# Patient Record
Sex: FEMALE | Race: WHITE | Employment: FULL TIME | URBAN - METROPOLITAN AREA
[De-identification: names, ages, dates, MRNs, and addresses within clinical notes are randomized per-mention and may not be internally consistent; named-entity substitution may affect disease eponyms.]

---

## 2017-05-17 ENCOUNTER — HOSPITAL ENCOUNTER (EMERGENCY)
Age: 57
Discharge: HOME | End: 2017-05-17
Attending: EMERGENCY MEDICINE
Payer: COMMERCIAL

## 2017-05-17 ENCOUNTER — APPOINTMENT (OUTPATIENT)
Dept: GENERAL RADIOLOGY | Age: 57
End: 2017-05-17
Attending: PHYSICIAN ASSISTANT
Payer: COMMERCIAL

## 2017-05-17 VITALS
WEIGHT: 195 LBS | DIASTOLIC BLOOD PRESSURE: 56 MMHG | HEART RATE: 67 BPM | HEIGHT: 67 IN | SYSTOLIC BLOOD PRESSURE: 115 MMHG | BODY MASS INDEX: 30.61 KG/M2 | OXYGEN SATURATION: 97 % | TEMPERATURE: 98 F | RESPIRATION RATE: 20 BRPM

## 2017-05-17 DIAGNOSIS — J06.9 UPPER RESPIRATORY TRACT INFECTION, UNSPECIFIED TYPE: Primary | ICD-10-CM

## 2017-05-17 PROBLEM — I48.91 A-FIB (HCC): Status: ACTIVE | Noted: 2017-05-17

## 2017-05-17 PROCEDURE — 94640 AIRWAY INHALATION TREATMENT: CPT

## 2017-05-17 PROCEDURE — 71020 XR CHEST PA + LAT CHEST (CPT=71020): CPT | Performed by: PHYSICIAN ASSISTANT

## 2017-05-17 PROCEDURE — 99284 EMERGENCY DEPT VISIT MOD MDM: CPT

## 2017-05-17 RX ORDER — AMOXICILLIN AND CLAVULANATE POTASSIUM 875; 125 MG/1; MG/1
1 TABLET, FILM COATED ORAL 2 TIMES DAILY
Qty: 20 TABLET | Refills: 0 | Status: SHIPPED | OUTPATIENT
Start: 2017-05-17 | End: 2017-05-27

## 2017-05-17 RX ORDER — ALBUTEROL SULFATE 2.5 MG/3ML
2.5 SOLUTION RESPIRATORY (INHALATION) ONCE
Status: COMPLETED | OUTPATIENT
Start: 2017-05-17 | End: 2017-05-17

## 2017-05-17 RX ORDER — PREDNISONE 20 MG/1
40 TABLET ORAL DAILY
Qty: 10 TABLET | Refills: 0 | Status: SHIPPED | OUTPATIENT
Start: 2017-05-17 | End: 2017-05-22

## 2017-05-17 RX ORDER — ALBUTEROL SULFATE 90 UG/1
2 AEROSOL, METERED RESPIRATORY (INHALATION) EVERY 4 HOURS PRN
Qty: 1 INHALER | Refills: 0 | Status: SHIPPED | OUTPATIENT
Start: 2017-05-17 | End: 2017-06-16

## 2017-05-17 RX ORDER — BENZONATATE 100 MG/1
100 CAPSULE ORAL 3 TIMES DAILY PRN
COMMUNITY
End: 2022-02-01 | Stop reason: ALTCHOICE

## 2017-05-17 RX ORDER — ALBUTEROL SULFATE 2.5 MG/3ML
2.5 SOLUTION RESPIRATORY (INHALATION) EVERY 4 HOURS PRN
Qty: 30 AMPULE | Refills: 0 | Status: SHIPPED | OUTPATIENT
Start: 2017-05-17 | End: 2017-06-16

## 2017-05-17 RX ORDER — ALBUTEROL SULFATE 90 UG/1
AEROSOL, METERED RESPIRATORY (INHALATION) EVERY 6 HOURS PRN
COMMUNITY

## 2017-05-17 RX ORDER — PREDNISONE 20 MG/1
20 TABLET ORAL DAILY
COMMUNITY
End: 2022-02-01 | Stop reason: ALTCHOICE

## 2017-05-17 NOTE — ED PROVIDER NOTES
Patient Seen in: THE [facility] Emergency Department In [place]    History   Patient presents with:  Dyspnea MILAN SOB (respiratory)    Stated Complaint: milan    HPI    Patient is a pleasant 58-year-old female.   90s prior to arrival, patient had onset of upper r route.   Diltiazem HCl ER Coated Beads (CARDIZEM CD) 180 MG Oral Capsule SR 24 Hr,  Take 1 capsule (180 mg total) by mouth daily.    LEXAPRO 20 MG OR TABS,  1 TABLET DAILY   TETRACYCLINE  MG OR TABS,  PO QD   ZEGERID  MG OR CAPS,  1 CAPSULE DA rhythm, normal S1-S2, no murmur appreciable  Extremities: Full ROM, no deformity, NVI  Back: Full range of motion  Skin: No sign of trauma, Skin warm and dry, no induration or sign of infection. Neuro: Cranial nerves intact, Normal Gait.     ED Course   L

## 2017-05-17 NOTE — ED INITIAL ASSESSMENT (HPI)
Began feeling chest congestion about 9 days ago- went to walk in clinic and was given a \"shot\" of steroids and oral steroids - went to PMD 2 days later and increased steroids, started on antibiotic and cough med-- no improvement per pt--  Denies CP

## 2018-04-16 ENCOUNTER — CHARTING TRANS (OUTPATIENT)
Dept: OTHER | Age: 58
End: 2018-04-16

## 2018-04-16 ASSESSMENT — PAIN SCALES - GENERAL: PAINLEVEL_OUTOF10: 7

## 2018-05-27 ENCOUNTER — CHARTING TRANS (OUTPATIENT)
Dept: OTHER | Age: 58
End: 2018-05-27

## 2018-11-01 VITALS
RESPIRATION RATE: 16 BRPM | WEIGHT: 202.28 LBS | TEMPERATURE: 98.9 F | HEIGHT: 66 IN | BODY MASS INDEX: 32.51 KG/M2 | HEART RATE: 70 BPM

## 2018-11-01 VITALS
BODY MASS INDEX: 32.05 KG/M2 | TEMPERATURE: 98.4 F | HEIGHT: 66 IN | WEIGHT: 199.41 LBS | RESPIRATION RATE: 16 BRPM | HEART RATE: 76 BPM

## 2019-04-26 ENCOUNTER — HOSPITAL (OUTPATIENT)
Dept: OTHER | Age: 59
End: 2019-04-26

## 2019-04-26 ENCOUNTER — HOSPITAL (OUTPATIENT)
Dept: OTHER | Age: 59
End: 2019-04-26
Attending: INTERNAL MEDICINE

## 2019-04-27 LAB — PATHOLOGIST NAME: NORMAL

## 2022-02-09 ENCOUNTER — HOSPITAL ENCOUNTER (OUTPATIENT)
Dept: CV DIAGNOSTICS | Facility: HOSPITAL | Age: 62
Discharge: HOME | End: 2022-02-09
Attending: INTERNAL MEDICINE
Payer: COMMERCIAL

## 2022-02-09 DIAGNOSIS — I48.0 PAF (PAROXYSMAL ATRIAL FIBRILLATION) (HCC): ICD-10-CM

## 2022-02-09 PROCEDURE — 93306 TTE W/DOPPLER COMPLETE: CPT | Performed by: INTERNAL MEDICINE

## 2022-04-13 ENCOUNTER — PATIENT MESSAGE (OUTPATIENT)
Dept: ORTHOPEDICS CLINIC | Facility: CLINIC | Age: 62
End: 2022-04-13

## 2022-04-13 ENCOUNTER — TELEPHONE (OUTPATIENT)
Dept: ORTHOPEDICS CLINIC | Facility: CLINIC | Age: 62
End: 2022-04-13

## 2022-04-13 NOTE — TELEPHONE ENCOUNTER
Noted pt seen by PCP on 2/16/22, for acute left thigh pain & lumbar spondylosis. Left Hip+Pelvix XR & Lumbar XR done 2/10/22. Left Hip+Pelvis XR normal.  Lumbar XR with results noted below:  IMPRESSION:   1. Slight anterolisthesis of L5 on S1. Dextroconvex curvature of the lumbar spine. 2. Degenerative disc and facet changes throughout the lumbar spine. MRI for Left Hip & Lumbar ordered, but not done yet d/t denied even after P2P. Pt trialed medrol dosepak & given PT order. Noted Left Hip XR was NWB  Order placed for Left Hip+Pelvis XR WB. Scheduled XR appt. MyChart sent to pt advising to arrive 30 min early to complete prior to appt.   Future Appointments   Date Time Provider Solange Ricks   5/16/2022  2:15 PM N[facility] XR RM1 [facility] X[facility] [place]   5/16/2022  2:40 PM Js Pace MD [facility]

## 2022-04-14 ENCOUNTER — PATIENT MESSAGE (OUTPATIENT)
Dept: ORTHOPEDICS CLINIC | Facility: CLINIC | Age: 62
End: 2022-04-14

## 2022-04-14 NOTE — TELEPHONE ENCOUNTER
From: Adina Way  Sent: 4/14/2022 8:44 AM CDT  To: [facility] Clinical Pool  Subject: X-Ray Ordered    Yes, canceling all.  Thanks

## 2024-04-13 ENCOUNTER — APPOINTMENT (OUTPATIENT)
Dept: CT IMAGING | Facility: HOSPITAL | Age: 64
End: 2024-04-13
Attending: EMERGENCY MEDICINE
Payer: COMMERCIAL

## 2024-04-13 ENCOUNTER — HOSPITAL ENCOUNTER (EMERGENCY)
Facility: HOSPITAL | Age: 64
Discharge: HOME | End: 2024-04-14
Attending: EMERGENCY MEDICINE
Payer: COMMERCIAL
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
## 2024-04-13 DIAGNOSIS — S02.85XA CLOSED FRACTURE OF ORBITAL WALL, INITIAL ENCOUNTER (HCC): Primary | ICD-10-CM

## 2024-04-13 DIAGNOSIS — S09.90XA INJURY OF HEAD, INITIAL ENCOUNTER: ICD-10-CM

## 2024-04-13 DIAGNOSIS — F10.920 ALCOHOLIC INTOXICATION WITHOUT COMPLICATION (HCC): ICD-10-CM

## 2024-04-13 DIAGNOSIS — E87.6 HYPOKALEMIA: ICD-10-CM

## 2024-04-13 LAB
ALBUMIN SERPL-MCNC: 3.5 G/DL (ref 3.4–5)
ALBUMIN/GLOB SERPL: 0.9 {RATIO} (ref 1–2)
ALP LIVER SERPL-CCNC: 124 U/L
ALT SERPL-CCNC: 24 U/L
ANION GAP SERPL CALC-SCNC: 10 MMOL/L (ref 0–18)
APTT PPP: 29.4 SECONDS (ref 23.3–35.6)
AST SERPL-CCNC: 21 U/L (ref 15–37)
BASOPHILS # BLD AUTO: 0.04 X10(3) UL (ref 0–0.2)
BASOPHILS NFR BLD AUTO: 0.5 %
BILIRUB SERPL-MCNC: 0.1 MG/DL (ref 0.1–2)
BUN BLD-MCNC: 14 MG/DL (ref 9–23)
CALCIUM BLD-MCNC: 9.5 MG/DL (ref 8.5–10.1)
CHLORIDE SERPL-SCNC: 106 MMOL/L (ref 98–112)
CO2 SERPL-SCNC: 22 MMOL/L (ref 21–32)
CREAT BLD-MCNC: 0.77 MG/DL
EGFRCR SERPLBLD CKD-EPI 2021: 86 ML/MIN/1.73M2 (ref 60–?)
EOSINOPHIL # BLD AUTO: 0.11 X10(3) UL (ref 0–0.7)
EOSINOPHIL NFR BLD AUTO: 1.4 %
ERYTHROCYTE [DISTWIDTH] IN BLOOD BY AUTOMATED COUNT: 12.6 %
ETHANOL SERPL-MCNC: 257 MG/DL (ref ?–3)
GLOBULIN PLAS-MCNC: 3.7 G/DL (ref 2.8–4.4)
GLUCOSE BLD-MCNC: 123 MG/DL (ref 70–99)
HCT VFR BLD AUTO: 34.7 %
HGB BLD-MCNC: 12.4 G/DL
IMM GRANULOCYTES # BLD AUTO: 0.03 X10(3) UL (ref 0–1)
IMM GRANULOCYTES NFR BLD: 0.4 %
LYMPHOCYTES # BLD AUTO: 3.44 X10(3) UL (ref 1–4)
LYMPHOCYTES NFR BLD AUTO: 42.5 %
MCH RBC QN AUTO: 33.1 PG (ref 26–34)
MCHC RBC AUTO-ENTMCNC: 35.7 G/DL (ref 31–37)
MCV RBC AUTO: 92.5 FL
MONOCYTES # BLD AUTO: 1.04 X10(3) UL (ref 0.1–1)
MONOCYTES NFR BLD AUTO: 12.9 %
NEUTROPHILS # BLD AUTO: 3.43 X10 (3) UL (ref 1.5–7.7)
NEUTROPHILS # BLD AUTO: 3.43 X10(3) UL (ref 1.5–7.7)
NEUTROPHILS NFR BLD AUTO: 42.3 %
OSMOLALITY SERPL CALC.SUM OF ELEC: 288 MOSM/KG (ref 275–295)
PLATELET # BLD AUTO: 266 10(3)UL (ref 150–450)
POTASSIUM SERPL-SCNC: 3.1 MMOL/L (ref 3.5–5.1)
PROT SERPL-MCNC: 7.2 G/DL (ref 6.4–8.2)
RBC # BLD AUTO: 3.75 X10(6)UL
SODIUM SERPL-SCNC: 138 MMOL/L (ref 136–145)
WBC # BLD AUTO: 8.1 X10(3) UL (ref 4–11)

## 2024-04-13 PROCEDURE — 70450 CT HEAD/BRAIN W/O DYE: CPT | Performed by: EMERGENCY MEDICINE

## 2024-04-13 PROCEDURE — 70486 CT MAXILLOFACIAL W/O DYE: CPT | Performed by: EMERGENCY MEDICINE

## 2024-04-13 PROCEDURE — 85025 COMPLETE CBC W/AUTO DIFF WBC: CPT | Performed by: EMERGENCY MEDICINE

## 2024-04-13 PROCEDURE — 93010 ELECTROCARDIOGRAM REPORT: CPT

## 2024-04-13 PROCEDURE — 76377 3D RENDER W/INTRP POSTPROCES: CPT | Performed by: EMERGENCY MEDICINE

## 2024-04-13 PROCEDURE — 85610 PROTHROMBIN TIME: CPT

## 2024-04-13 PROCEDURE — 80053 COMPREHEN METABOLIC PANEL: CPT | Performed by: EMERGENCY MEDICINE

## 2024-04-13 PROCEDURE — 82077 ASSAY SPEC XCP UR&BREATH IA: CPT | Performed by: EMERGENCY MEDICINE

## 2024-04-13 PROCEDURE — 85730 THROMBOPLASTIN TIME PARTIAL: CPT | Performed by: EMERGENCY MEDICINE

## 2024-04-13 PROCEDURE — 36415 COLL VENOUS BLD VENIPUNCTURE: CPT

## 2024-04-13 PROCEDURE — 93005 ELECTROCARDIOGRAM TRACING: CPT

## 2024-04-13 PROCEDURE — 72125 CT NECK SPINE W/O DYE: CPT | Performed by: EMERGENCY MEDICINE

## 2024-04-13 PROCEDURE — 83735 ASSAY OF MAGNESIUM: CPT | Performed by: EMERGENCY MEDICINE

## 2024-04-13 PROCEDURE — 80053 COMPREHEN METABOLIC PANEL: CPT

## 2024-04-13 PROCEDURE — 99284 EMERGENCY DEPT VISIT MOD MDM: CPT

## 2024-04-13 PROCEDURE — 85730 THROMBOPLASTIN TIME PARTIAL: CPT

## 2024-04-13 PROCEDURE — 85025 COMPLETE CBC W/AUTO DIFF WBC: CPT

## 2024-04-13 PROCEDURE — 82077 ASSAY SPEC XCP UR&BREATH IA: CPT

## 2024-04-13 PROCEDURE — 99285 EMERGENCY DEPT VISIT HI MDM: CPT

## 2024-04-13 PROCEDURE — 85610 PROTHROMBIN TIME: CPT | Performed by: EMERGENCY MEDICINE

## 2024-04-13 RX ORDER — ATORVASTATIN CALCIUM 10 MG/1
10 TABLET, FILM COATED ORAL NIGHTLY
COMMUNITY

## 2024-04-14 VITALS
TEMPERATURE: 98 F | WEIGHT: 230 LBS | HEART RATE: 77 BPM | DIASTOLIC BLOOD PRESSURE: 68 MMHG | SYSTOLIC BLOOD PRESSURE: 118 MMHG | OXYGEN SATURATION: 94 % | RESPIRATION RATE: 16 BRPM | BODY MASS INDEX: 37 KG/M2

## 2024-04-14 LAB
ATRIAL RATE: 79 BPM
INR BLD: 1.01 (ref 0.8–1.2)
MAGNESIUM SERPL-MCNC: 1.9 MG/DL (ref 1.6–2.6)
P AXIS: 75 DEGREES
P-R INTERVAL: 186 MS
PROTHROMBIN TIME: 13.3 SECONDS (ref 11.6–14.8)
Q-T INTERVAL: 380 MS
QRS DURATION: 90 MS
QTC CALCULATION (BEZET): 435 MS
R AXIS: 77 DEGREES
T AXIS: 77 DEGREES
VENTRICULAR RATE: 79 BPM

## 2024-04-14 RX ORDER — POTASSIUM CHLORIDE 20 MEQ/1
40 TABLET, EXTENDED RELEASE ORAL ONCE
Status: COMPLETED | OUTPATIENT
Start: 2024-04-14 | End: 2024-04-14

## 2024-04-14 RX ORDER — ACETAMINOPHEN AND CODEINE PHOSPHATE 300; 30 MG/1; MG/1
1-2 TABLET ORAL EVERY 6 HOURS PRN
Qty: 10 TABLET | Refills: 0 | Status: SHIPPED | OUTPATIENT
Start: 2024-04-14 | End: 2024-04-19

## 2024-04-14 NOTE — ED PROVIDER NOTES
Patient Seen in: [facility] Emergency Department      History     Chief Complaint   Patient presents with    Trauma    Fall     Stated Complaint: Fall    Subjective:   HPI    Patient is a 63-year-old female presenting to the ED after falling.  The patient states she was at her brother's house and had a few drinks.  She took an Uber home.  She went to her package area in her building and was trying to get a box opened for dog food.  She states that she was leaning over when she fell forward striking her face and head.  She is on anticoagulation.  Taking Eliquis for history of A-fib.  She denies any loss of consciousness.  She is complaining of pain and headache to the right side of her head and  face.  She denies any acute visual changes.  No double vision, no blurred vision, no visual loss.  No eye pain.  Pain is rated as a 7 out of 10, constant, improved with ice packs upon arrival.  No nausea or vomiting.  No weakness or loss of sensation.  No bowel or bladder incontinence.  No chest pain or shortness of breath.    Objective:   Past Medical History:    Atrial fibrillation (HCC)    BCC (basal cell carcinoma of skin)    Current smoker    Depression    Essential hypertension    Hyperlipidemia              Past Surgical History:   Procedure Laterality Date    Skin surgery      BCC                Social History     Socioeconomic History    Marital status:    Tobacco Use    Smoking status: Every Day              Review of Systems    Positive for stated complaint: Fall  Other systems are as noted in HPI.  Constitutional and vital signs reviewed.      All other systems reviewed and negative except as noted above.    Physical Exam     ED Triage Vitals   BP 04/13/24 2323 120/63   Pulse 04/13/24 2323 76   Resp 04/13/24 2323 20   Temp 04/14/24 0606 98.3 °F (36.8 °C)   Temp src 04/14/24 0606 Oral   SpO2 04/13/24 2323 92 %   O2 Device 04/13/24 2323 None (Room air)       Current:/68   Pulse 77   Temp 98.3  °F (36.8 °C) (Oral)   Resp 16   Wt 104.3 kg   LMP 12/31/1999 (Approximate)   SpO2 94%   BMI 37.12 kg/m²         Physical Exam  Vitals and nursing note reviewed.   Constitutional:       General: She is not in acute distress.     Appearance: Normal appearance. She is well-developed. She is not ill-appearing or toxic-appearing.   HENT:      Head: Normocephalic and atraumatic.        Right Ear: External ear normal.      Left Ear: External ear normal.      Mouth/Throat:      Mouth: Mucous membranes are moist.      Pharynx: Oropharynx is clear.   Eyes:      General: Vision grossly intact.         Right eye: No discharge.         Left eye: No discharge.      Extraocular Movements: Extraocular movements intact.      Conjunctiva/sclera: Conjunctivae normal.      Right eye: Chemosis present. No exudate or hemorrhage.     Pupils: Pupils are equal, round, and reactive to light.      Comments: No proptosis.   Cardiovascular:      Rate and Rhythm: Normal rate and regular rhythm.      Heart sounds: Normal heart sounds.   Pulmonary:      Effort: Pulmonary effort is normal.      Breath sounds: Normal breath sounds.   Abdominal:      General: Abdomen is flat. Bowel sounds are normal. There is no distension.      Tenderness: There is no abdominal tenderness.   Musculoskeletal:      Right lower leg: No edema.      Left lower leg: No edema.   Skin:     General: Skin is warm.      Capillary Refill: Capillary refill takes less than 2 seconds.      Findings: No rash.   Neurological:      Mental Status: She is alert.   Psychiatric:         Mood and Affect: Mood normal.         Behavior: Behavior normal.               ED Course     Labs Reviewed   COMP METABOLIC PANEL (14) - Abnormal; Notable for the following components:       Result Value    Glucose 123 (*)     Potassium 3.1 (*)     A/G Ratio 0.9 (*)     All other components within normal limits   ETHYL ALCOHOL - Abnormal; Notable for the following components:    Ethyl Alcohol 257 (*)      All other components within normal limits   CBC W/ DIFFERENTIAL - Abnormal; Notable for the following components:    RBC 3.75 (*)     HCT 34.7 (*)     Monocyte Absolute 1.04 (*)     All other components within normal limits   PROTHROMBIN TIME (PT) - Normal   PTT, ACTIVATED - Normal   MAGNESIUM - Normal   CBC WITH DIFFERENTIAL WITH PLATELET    Narrative:     The following orders were created for panel order CBC With Differential With Platelet.  Procedure                               Abnormality         Status                     ---------                               -----------         ------                     CBC W/ DIFFERENTIAL[[id number]]          Abnormal            Final result                 Please view results for these tests on the individual orders.     EKG    Rate, intervals and axes as noted on EKG Report.  Rate: 79  Rhythm: Sinus Rhythm  Reading: When compared to previous EKG from February 8, 2016, sinus rhythm has replaced atrial fibrillation.  Nonspecific T wave changes                          MDM      History obtained from patient.     Differential diagnosis includes skull fracture, intracranial hemorrhage, head injury with concussion, cervical spine Fracture, facial fracture patient.  The patient does not have any eye pain or acute visual disturbance.  No proptosis on examination.  Extraocular muscles are intact.  Therefore, low suspicion for retrobulbar hematoma.  I do not suspect entrapment if patient does have orbital wall fracture  Previous records reviewed.  No recent ED visits.  The patient was seen for lumbar epidural steroid injection December 2023.  She has a history of lumbar spondylosis as well as lumbar radiculitis.  The patient states that she does smoke and may have a history of COPD.  She has used inhalers in the past.    Testing considered and ordered includes CT of the head, cervical spine, and facial bones.  The patient does not have a ride home, therefore labs were  obtained.  Patient did admit to alcohol use as well this evening.    I reviewed all results.  CBC reviewed.  CMP also reviewed with potassium 3.1.  Coagulation studies were normal.  Magnesium was added.  This was normal.  Alcohol 257.      I also reviewed the official radiology report which shows   CT FACIAL BONES (CPT=70486)    Result Date: 4/14/2024  PROCEDURE:  CT FACIAL BONES (CPT=70486)  COMPARISON:  None.  INDICATIONS:  Fall, periorbital pain  TECHNIQUE:  Noncontrast CT scanning is performed through the facial bones. 3D shaded surface renderings are created on an independent CT scanner workstation. Dose reduction techniques were used. Dose information is transmitted to the ACR (American College of Radiology) NRDR (National Radiology Data Registry) which includes the Dose Index Registry.  3-D RENDERING:  Three dimensional image processing was completed using a separate workstation under concurrent supervision. Images were archived.  PATIENT STATED HISTORY:(As transcribed by Technologist)  Fall with lacerations to her forehead and face.    FINDINGS:  SINUSES:  No visible mass, significant fluid or mucosal thickening.  NASAL FOSSA:  No mass, fracture, or significant septal deviation.  SKULL BASE:  No mass or bone destruction.  FACIAL BONES:  No bony lesion or fracture  ORBITS:  Fracture of the right orbital floor, medial to the infraorbital foramen with osseous defect measuring 8 mm in transverse length (series 6, image 43).  Herniation of intraorbital fat into the right maxillary sinus, additionally with deviation of the right inferior rectus muscle towards the defect (series 5, image 44).  Mild volume of right periorbital soft tissue gas with extension posteriorly into the intraconal/retro bulbar fat.  The globes are intact.  No proptosis. CAVERNOUS SINUS:  Symmetric appearance with no visible lesion.  SALIVARY GLANDS:  The parotid and submandibular glands are unremarkable.  OTHER:  Moderate right  periorbital/frontal soft tissue swelling and small hematoma.            CONCLUSION:   1. Fracture of the right orbital floor, medial to the infraorbital foramen, resulting in osseous defect measuring 8 mm in transverse length.  Associated herniation of intraorbital fat into the adjacent right maxillary sinus, additionally with deviation of the right inferior rectus muscle towards the defect.  Correlate with clinical evidence of muscle entrapment.  2. Mild volume of right periorbital soft tissue gas with extension posteriorly into the intraconal/retrobulbar fat.  Globes are intact.  3.  Moderate right periorbital/frontal soft tissue swelling and hematoma.   LOCATION:  [facility]   Dictated by (CST): Cat Olmos MD on 4/14/2024 at 0:11 AM     Finalized by (CST): Cat Olmos MD on 4/14/2024 at 0:18 AM       CT SPINE CERVICAL (CPT=72125)    Result Date: 4/14/2024  PROCEDURE:  CT SPINE CERVICAL (CPT=72125)  COMPARISON:  None.  INDICATIONS:  Fall with head and neck pain  TECHNIQUE:  Noncontrast CT scanning of the cervical spine is performed from the skull base through C7.  Multiplanar reconstructions are generated.  Dose reduction techniques were used. Dose information is transmitted to the ACR (American College of Radiology) NRDR (National Radiology Data Registry) which includes the Dose Index Registry.  PATIENT STATED HISTORY: (As transcribed by Technologist)  Fall with lacerations to her forehead and face.    FINDINGS:   No acute fracture or traumatic malalignment of the cervical spine.  Moderate disc height loss and endplate degenerative change noted at the C5-6 and C6-7 levels.  No high-grade central canal stenosis.  Facet and uncovertebral hypertrophy contribute to varying degrees of neural foraminal stenosis, moderate to severe at the C4-5 and C5-6 levels.  Lateral masses are aligned.  Moderate degenerative arthrosis of the atlantodental interval.  No prevertebral edema.  No epidural or paraspinal collections.  Lung  apices are clear.            CONCLUSION:   Cervical spondylosis without evidence of acute traumatic injury of the cervical spine.    LOCATION:  [facility]   Dictated by (CST): Cat Olmos MD on 4/14/2024 at 0:03 AM     Finalized by (CST): Cat Olmos MD on 4/14/2024 at 0:07 AM       CT BRAIN OR HEAD (51312)    Result Date: 4/14/2024  PROCEDURE:  CT BRAIN OR HEAD (20243)  COMPARISON:  None.  INDICATIONS:  Fall with head and neck pain  TECHNIQUE:  Noncontrast CT scanning is performed through the brain. Dose reduction techniques were used. Dose information is transmitted to the ACR (American College of Radiology) NRDR (National Radiology Data Registry) which includes the Dose Index Registry.  PATIENT STATED HISTORY: (As transcribed by Technologist)  Fall with lacerations to her forehead and face.    FINDINGS:  VENTRICLES/SULCI:  Ventricles and sulci are concordant with age. INTRACRANIAL:  No intracranial hemorrhage, mass effect, or acute large vessel transcortical infarct.  Gray-white differentiation is preserved. SINUSES:           Paranasal sinuses and mastoid air cells are clear. SKULL:             Calvarium is intact.  Right orbital floor fracture, seen to better advantage on dedicated orbital CT.  OTHER:             Right frontal scalp laceration and mild hematoma            CONCLUSION:   1. Negative for acute intracranial process.  2. Right frontal scalp laceration and hematoma without calvarial fracture.     LOCATION:  [facility]   Dictated by (CST): Cat Olmos MD on 4/13/2024 at 11:58 PM     Finalized by (CST): Cat Olmos MD on 4/14/2024 at 0:02 AM        Interventions in care included patient was given potassium replacement as well as ice packs per swelling.  Did not require anything for pain in ED.  Will prescribe pain medication to use as directed as needed at home for facial fracture.  Discussed all results as well as plan for discharge with close outpatient follow-up.  Plan to also provide follow-up information  regarding facial fracture.  Plan to return to the ED if any symptoms worsen, persist, or new symptoms develop.  Otherwise, outpatient follow-up as well with primary care provider for reevaluation.  Patient remains awake, alert, and oriented x 3.  She did not have a ride home but was ultimately discharged in the morning when sober.                                         Medical Decision Making      Disposition and Plan     Clinical Impression:  1. Closed fracture of orbital wall, initial encounter ([facility])    2. Fall, initial encounter    3. Alcoholic intoxication without complication ([facility])    4. Injury of head, initial encounter    5. Hypokalemia         Disposition:  Discharge  4/14/2024  5:57 am    Follow-up:  Mauricio Echavarria MD    Schedule an appointment as soon as possible for a visit in 2 day(s)      [facility] Emergency Department  [address] [address]  [phone]  Follow up  IF SYMPTOMS WORSEN, PERSIST, OR NEW SYMPTOMS Yovanny Villeda, DO  [address]  [address]  [facility] [id number] [phone]    Schedule an appointment as soon as possible for a visit      Melba sU, DMD  [address] [facility] [address]  [phone]    Schedule an appointment as soon as possible for a visit            Medications Prescribed:  Discharge Medication List as of 4/14/2024  6:08 AM        START taking these medications    Details   acetaminophen-codeine 300-30 MG Oral Tab Take 1-2 tablets by mouth every 6 (six) hours as needed for Pain., Normal, Disp-10 tablet, R-0

## 2024-04-14 NOTE — ED INITIAL ASSESSMENT (HPI)
Pt had a \"few\" drinks bent down and fell head first onto pavement. Pt has swelling around right eye. Pt c/o pain in head 5/10. Unknown LOC.

## 2024-04-14 NOTE — ED QUICK NOTES
Pt states she was reaching for a box and somehow she fell. Pt states she had no LOC. Pt is c/o pain in right front head.

## (undated) DIAGNOSIS — M25.552 LEFT HIP PAIN: Primary | ICD-10-CM

## (undated) NOTE — ED AVS SNAPSHOT
THE [facility] Emergency Department in [address]    Phone:  [phone]    Fax:  [phone]           Sheilda Mail   MRN: [id number]    Department:  THE [facility] Emergency Department in [place]   Date of Visi Commonly known as:  DELTASONE   Take 2 tablets (40 mg total) by mouth daily. What changed: This medication is already on your medication list.  Do NOT take both doses of the new and old medication. ONLY take the dose prescribed today.             Where You were examined and treated today on an urgent basis only. This was not a substitute for ongoing medical care. Often, one Emergency Department visit does not uncover every injury or illness.  If you have been referred to a primary care or a specialist ph Xin Acuna [address] ([address]) [address]  Zelalem Herter (Dian Labrum) [phone] [address]) [phone]                Additional Information       We are concerned for your o CARDIAC:  Normal size cardiac silhouette. MEDIASTINUM:  Normal.  PLEURA:  Normal.  No pleural effusions. BONES:  Normal for age. [employer]     Sign up for [employer], your secure online medical record.   [employer] will allow you to access patient ins

## (undated) NOTE — ED AVS SNAPSHOT
THE [facility] Emergency Department in [address] [facility]    Phone:  [phone]    Fax:  [phone]           Ailyn Andrews   MRN: [id number]    Department:  THE [facility] Emergency Department in [place]   Date of Visi IF THERE IS ANY CHANGE OR WORSENING OF YOUR CONDITION, CALL YOUR PRIMARY CARE PHYSICIAN AT ONCE OR RETURN IMMEDIATELY TO THE EMERGENCY DEPARTMENT.     If you have been prescribed any medication(s), please fill your prescription right away and begin taking t